# Patient Record
Sex: FEMALE | URBAN - METROPOLITAN AREA
[De-identification: names, ages, dates, MRNs, and addresses within clinical notes are randomized per-mention and may not be internally consistent; named-entity substitution may affect disease eponyms.]

---

## 2019-12-10 ENCOUNTER — APPOINTMENT (RX ONLY)
Dept: URBAN - METROPOLITAN AREA CLINIC 2 | Facility: CLINIC | Age: 49
Setting detail: DERMATOLOGY
End: 2019-12-10

## 2019-12-10 ENCOUNTER — RX ONLY (OUTPATIENT)
Age: 49
Setting detail: RX ONLY
End: 2019-12-10

## 2019-12-10 DIAGNOSIS — L30.1 DYSHIDROSIS [POMPHOLYX]: ICD-10-CM

## 2019-12-10 DIAGNOSIS — B07.8 OTHER VIRAL WARTS: ICD-10-CM

## 2019-12-10 DIAGNOSIS — L30.3 INFECTIVE DERMATITIS: ICD-10-CM

## 2019-12-10 PROCEDURE — ? PRESCRIPTION

## 2019-12-10 PROCEDURE — ? COUNSELING

## 2019-12-10 PROCEDURE — 99202 OFFICE O/P NEW SF 15 MIN: CPT

## 2019-12-10 PROCEDURE — ? DEFER

## 2019-12-10 PROCEDURE — ? TREATMENT REGIMEN

## 2019-12-10 RX ORDER — CLOBETASOL PROPIONATE 0.5 MG/G
OINTMENT TOPICAL BID
Qty: 45 | Refills: 2 | Status: ERX | COMMUNITY
Start: 2019-12-10

## 2019-12-10 RX ORDER — CLOBETASOL PROPIONATE 0.5 MG/G
OINTMENT TOPICAL BID
Qty: 45 | Refills: 2 | Status: ERX

## 2019-12-10 RX ADMIN — CLOBETASOL PROPIONATE 1: 0.5 OINTMENT TOPICAL at 00:00

## 2019-12-10 ASSESSMENT — PAIN INTENSITY VAS: HOW INTENSE IS YOUR PAIN 0 BEING NO PAIN, 10 BEING THE MOST SEVERE PAIN POSSIBLE?: 6/10 PAIN

## 2019-12-10 ASSESSMENT — LOCATION ZONE DERM
LOCATION ZONE: FINGER
LOCATION ZONE: HAND

## 2019-12-10 ASSESSMENT — LOCATION DETAILED DESCRIPTION DERM
LOCATION DETAILED: RIGHT PROXIMAL DORSAL RING FINGER
LOCATION DETAILED: RIGHT RADIAL DORSAL HAND
LOCATION DETAILED: LEFT ULNAR DORSAL HAND
LOCATION DETAILED: RIGHT MIDDLE FINGER PROXIMAL INTERPHALANGEAL JOINT

## 2019-12-10 ASSESSMENT — LOCATION SIMPLE DESCRIPTION DERM
LOCATION SIMPLE: LEFT HAND
LOCATION SIMPLE: RIGHT MIDDLE FINGER
LOCATION SIMPLE: RIGHT HAND
LOCATION SIMPLE: RIGHT RING FINGER

## 2019-12-10 ASSESSMENT — SEVERITY ASSESSMENT: SEVERITY: MODERATE TO SEVERE

## 2019-12-10 NOTE — PROCEDURE: TREATMENT REGIMEN
Otc Regimen: Bleach soaks at home once to twice a week, one capful to a big bowl
Samples Given: Cicaplast and hydrating cleanser
Detail Level: Simple

## 2019-12-10 NOTE — PROCEDURE: DEFER
Introduction Text (Please End With A Colon): The following procedure was deferred:
Detail Level: Detailed
Procedure To Be Performed At Next Visit: Treatment
Reason To Defer Override: will treat if needed once eczema is under control

## 2019-12-11 ENCOUNTER — RX ONLY (OUTPATIENT)
Age: 49
Setting detail: RX ONLY
End: 2019-12-11

## 2019-12-11 RX ORDER — CLOBETASOL PROPIONATE 0.5 MG/G
OINTMENT TOPICAL BID
Qty: 45 | Refills: 2 | Status: ERX

## 2020-03-20 ENCOUNTER — RX ONLY (OUTPATIENT)
Age: 50
Setting detail: RX ONLY
End: 2020-03-20

## 2020-03-20 RX ORDER — HALOBETASOL PROPIONATE 0.5 MG/G
1 OINTMENT TOPICAL BID
Qty: 1 | Refills: 2 | Status: ERX | COMMUNITY
Start: 2020-03-20

## 2023-04-06 ENCOUNTER — APPOINTMENT (RX ONLY)
Dept: URBAN - METROPOLITAN AREA CLINIC 2 | Facility: CLINIC | Age: 53
Setting detail: DERMATOLOGY
End: 2023-04-06

## 2023-04-06 DIAGNOSIS — L20.89 OTHER ATOPIC DERMATITIS: ICD-10-CM | Status: INADEQUATELY CONTROLLED

## 2023-04-06 PROBLEM — L20.84 INTRINSIC (ALLERGIC) ECZEMA: Status: ACTIVE | Noted: 2023-04-06

## 2023-04-06 PROCEDURE — ? PRESCRIPTION

## 2023-04-06 PROCEDURE — ? COUNSELING

## 2023-04-06 PROCEDURE — ? TREATMENT REGIMEN

## 2023-04-06 PROCEDURE — 99204 OFFICE O/P NEW MOD 45 MIN: CPT

## 2023-04-06 RX ORDER — MOMETASONE FUROATE 1 MG/G
1 OINTMENT TOPICAL BID PRN
Qty: 45 | Refills: 2 | Status: ERX | COMMUNITY
Start: 2023-04-06

## 2023-04-06 RX ADMIN — MOMETASONE FUROATE 1: 1 OINTMENT TOPICAL at 00:00

## 2023-04-06 ASSESSMENT — LOCATION SIMPLE DESCRIPTION DERM
LOCATION SIMPLE: CHEST
LOCATION SIMPLE: LEFT UPPER BACK
LOCATION SIMPLE: LEFT BREAST
LOCATION SIMPLE: POSTERIOR NECK

## 2023-04-06 ASSESSMENT — LOCATION DETAILED DESCRIPTION DERM
LOCATION DETAILED: LEFT AXILLARY TAIL OF BREAST
LOCATION DETAILED: LEFT LATERAL UPPER BACK
LOCATION DETAILED: LEFT LATERAL SUPERIOR CHEST
LOCATION DETAILED: UPPER STERNUM
LOCATION DETAILED: LEFT MEDIAL TRAPEZIAL NECK
LOCATION DETAILED: RIGHT MEDIAL SUPERIOR CHEST

## 2023-04-06 ASSESSMENT — LOCATION ZONE DERM
LOCATION ZONE: NECK
LOCATION ZONE: TRUNK

## 2023-04-06 NOTE — PROCEDURE: TREATMENT REGIMEN
Initiate Treatment: Moisturizer such as Vanicream cream, CeraVe cream, or Cetaphil cream daily after showering.  Mometasone ointment on affected areas after moisturizer.
Plan: Follow up in 6 weeks
Detail Level: Detailed

## 2023-06-06 ENCOUNTER — APPOINTMENT (RX ONLY)
Dept: URBAN - METROPOLITAN AREA CLINIC 2 | Facility: CLINIC | Age: 53
Setting detail: DERMATOLOGY
End: 2023-06-06

## 2023-06-06 DIAGNOSIS — L91.8 OTHER HYPERTROPHIC DISORDERS OF THE SKIN: ICD-10-CM

## 2023-06-06 DIAGNOSIS — L20.89 OTHER ATOPIC DERMATITIS: ICD-10-CM | Status: IMPROVED

## 2023-06-06 DIAGNOSIS — L82.1 OTHER SEBORRHEIC KERATOSIS: ICD-10-CM

## 2023-06-06 PROBLEM — L20.84 INTRINSIC (ALLERGIC) ECZEMA: Status: ACTIVE | Noted: 2023-06-06

## 2023-06-06 PROCEDURE — 17110 DESTRUCTION B9 LES UP TO 14: CPT

## 2023-06-06 PROCEDURE — 99213 OFFICE O/P EST LOW 20 MIN: CPT | Mod: 25

## 2023-06-06 PROCEDURE — ? LIQUID NITROGEN

## 2023-06-06 PROCEDURE — ? COUNSELING

## 2023-06-06 ASSESSMENT — LOCATION SIMPLE DESCRIPTION DERM
LOCATION SIMPLE: LEFT UPPER BACK
LOCATION SIMPLE: LEFT INFERIOR EYELID

## 2023-06-06 ASSESSMENT — LOCATION DETAILED DESCRIPTION DERM
LOCATION DETAILED: LEFT INFERIOR UPPER BACK
LOCATION DETAILED: LEFT LATERAL INFERIOR EYELID
LOCATION DETAILED: LEFT MEDIAL UPPER BACK

## 2023-06-06 ASSESSMENT — LOCATION ZONE DERM
LOCATION ZONE: TRUNK
LOCATION ZONE: EYELID

## 2023-06-06 NOTE — HPI: SKIN LESION
Is This A New Presentation, Or A Follow-Up?: Growth
What Type Of Note Output Would You Prefer (Optional)?: Standard Output
How Severe Is Your Skin Lesion?: mild
Has Your Skin Lesion Been Treated?: not been treated
Additional History: Patient reports growth interferes with vision.

## 2023-06-06 NOTE — PROCEDURE: COUNSELING
Patient Specific Counseling (Will Not Stick From Patient To Patient): Continue topical mometasone as needed for flares of rash.
Detail Level: Zone
Moisturizer Recommendations: Discussed importance of regular moisturizer to help prevent flares especially after showers. Recommended to use CeraVe Cream.

## 2023-06-06 NOTE — PROCEDURE: LIQUID NITROGEN
Medical Necessity Information: It is in your best interest to select a reason for this procedure from the list below. All of these items fulfill various CMS LCD requirements except the new and changing color options.
Add 52 Modifier (Optional): no
Show Aperture Variable?: Yes
Medical Necessity Clause: This procedure was medically necessary because the lesion was:
Consent: The patient's consent was obtained including but not limited to risks of crusting, scabbing, blistering, scarring, darker or lighter pigmentary change, recurrence, incomplete removal and infection.
Duration Of Freeze Thaw-Cycle (Seconds): 5-10
Number Of Freeze-Thaw Cycles: 3 freeze-thaw cycles
Post-Care Instructions: I reviewed with the patient in detail post-care instructions. Patient is to wear sunprotection, and avoid picking at any of the treated lesions. Pt may apply Vaseline to crusted or scabbing areas.
Detail Level: Detailed
Spray Paint Text: The liquid nitrogen was applied to the skin utilizing a spray paint frosting technique.